# Patient Record
Sex: FEMALE | Race: WHITE | Employment: UNEMPLOYED | URBAN - METROPOLITAN AREA
[De-identification: names, ages, dates, MRNs, and addresses within clinical notes are randomized per-mention and may not be internally consistent; named-entity substitution may affect disease eponyms.]

---

## 2018-08-04 ENCOUNTER — APPOINTMENT (OUTPATIENT)
Dept: CT IMAGING | Age: 43
End: 2018-08-04
Payer: OTHER MISCELLANEOUS

## 2018-08-04 ENCOUNTER — HOSPITAL ENCOUNTER (EMERGENCY)
Age: 43
Discharge: HOME | End: 2018-08-04
Attending: EMERGENCY MEDICINE
Payer: OTHER MISCELLANEOUS

## 2018-08-04 VITALS
WEIGHT: 107 LBS | SYSTOLIC BLOOD PRESSURE: 134 MMHG | HEART RATE: 82 BPM | RESPIRATION RATE: 16 BRPM | BODY MASS INDEX: 22.46 KG/M2 | OXYGEN SATURATION: 98 % | TEMPERATURE: 98.1 F | HEIGHT: 58 IN | DIASTOLIC BLOOD PRESSURE: 84 MMHG

## 2018-08-04 DIAGNOSIS — M54.2 NECK PAIN: Primary | ICD-10-CM
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
## 2018-08-04 PROCEDURE — 96372 THER/PROPH/DIAG INJ SC/IM: CPT

## 2018-08-04 PROCEDURE — 70450 CT HEAD/BRAIN W/O DYE: CPT

## 2018-08-04 PROCEDURE — 72125 CT NECK SPINE W/O DYE: CPT

## 2018-08-04 PROCEDURE — 72128 CT CHEST SPINE W/O DYE: CPT

## 2018-08-04 PROCEDURE — 6360000002 HC RX W HCPCS: Performed by: EMERGENCY MEDICINE

## 2018-08-04 PROCEDURE — 99283 EMERGENCY DEPT VISIT LOW MDM: CPT

## 2018-08-04 RX ORDER — KETOROLAC TROMETHAMINE 30 MG/ML
15 INJECTION, SOLUTION INTRAMUSCULAR; INTRAVENOUS ONCE
Status: COMPLETED | OUTPATIENT
Start: 2018-08-04 | End: 2018-08-04

## 2018-08-04 RX ORDER — IBUPROFEN 800 MG/1
800 TABLET ORAL EVERY 8 HOURS PRN
Qty: 21 TABLET | Refills: 0 | Status: SHIPPED | OUTPATIENT
Start: 2018-08-04 | End: 2019-04-06 | Stop reason: ALTCHOICE

## 2018-08-04 RX ORDER — CYCLOBENZAPRINE HCL 5 MG
5 TABLET ORAL 2 TIMES DAILY PRN
Qty: 10 TABLET | Refills: 0 | Status: SHIPPED | OUTPATIENT
Start: 2018-08-04 | End: 2019-04-06 | Stop reason: ALTCHOICE

## 2018-08-04 RX ORDER — ORPHENADRINE CITRATE 30 MG/ML
60 INJECTION INTRAMUSCULAR; INTRAVENOUS ONCE
Status: COMPLETED | OUTPATIENT
Start: 2018-08-04 | End: 2018-08-04

## 2018-08-04 RX ADMIN — ORPHENADRINE CITRATE 60 MG: 30 INJECTION INTRAMUSCULAR; INTRAVENOUS at 04:34

## 2018-08-04 RX ADMIN — KETOROLAC TROMETHAMINE 15 MG: 30 INJECTION, SOLUTION INTRAMUSCULAR at 04:34

## 2018-08-04 ASSESSMENT — ENCOUNTER SYMPTOMS
ABDOMINAL PAIN: 0
SINUS PAIN: 0
BOWEL INCONTINENCE: 0
VISUAL CHANGE: 0
SHORTNESS OF BREATH: 0
VOMITING: 0
DIARRHEA: 0
CHEST TIGHTNESS: 0
SORE THROAT: 0
COUGH: 0

## 2018-08-04 ASSESSMENT — PAIN SCALES - GENERAL
PAINLEVEL_OUTOF10: 6
PAINLEVEL_OUTOF10: 5
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
## 2019-04-06 ENCOUNTER — HOSPITAL ENCOUNTER (EMERGENCY)
Age: 44
Discharge: HOME | End: 2019-04-06

## 2019-04-06 ENCOUNTER — APPOINTMENT (OUTPATIENT)
Dept: CT IMAGING | Age: 44
End: 2019-04-06

## 2019-04-06 VITALS
OXYGEN SATURATION: 100 % | DIASTOLIC BLOOD PRESSURE: 79 MMHG | TEMPERATURE: 98.8 F | HEIGHT: 58 IN | RESPIRATION RATE: 16 BRPM | WEIGHT: 97 LBS | SYSTOLIC BLOOD PRESSURE: 135 MMHG | BODY MASS INDEX: 20.36 KG/M2 | HEART RATE: 95 BPM

## 2019-04-06 DIAGNOSIS — S01.81XA FACIAL LACERATION, INITIAL ENCOUNTER: Primary | ICD-10-CM

## 2019-04-06 PROCEDURE — 90471 IMMUNIZATION ADMIN: CPT | Performed by: PHYSICIAN ASSISTANT

## 2019-04-06 PROCEDURE — 90715 TDAP VACCINE 7 YRS/> IM: CPT | Performed by: PHYSICIAN ASSISTANT

## 2019-04-06 PROCEDURE — 6370000000 HC RX 637 (ALT 250 FOR IP): Performed by: PHYSICIAN ASSISTANT

## 2019-04-06 PROCEDURE — 99284 EMERGENCY DEPT VISIT MOD MDM: CPT

## 2019-04-06 PROCEDURE — 6360000002 HC RX W HCPCS: Performed by: PHYSICIAN ASSISTANT

## 2019-04-06 PROCEDURE — 70450 CT HEAD/BRAIN W/O DYE: CPT

## 2019-04-06 PROCEDURE — 12011 RPR F/E/E/N/L/M 2.5 CM/<: CPT

## 2019-04-06 RX ORDER — ACETAMINOPHEN 500 MG
1000 TABLET ORAL ONCE
Status: COMPLETED | OUTPATIENT
Start: 2019-04-06 | End: 2019-04-06

## 2019-04-06 RX ADMIN — ACETAMINOPHEN 1000 MG: 500 TABLET ORAL at 20:05

## 2019-04-06 RX ADMIN — TETANUS TOXOID, REDUCED DIPHTHERIA TOXOID AND ACELLULAR PERTUSSIS VACCINE, ADSORBED 0.5 ML: 5; 2.5; 8; 8; 2.5 SUSPENSION INTRAMUSCULAR at 20:05

## 2019-04-06 ASSESSMENT — PAIN SCALES - GENERAL
PAINLEVEL_OUTOF10: 7
PAINLEVEL_OUTOF10: 8

## 2019-04-06 ASSESSMENT — PAIN DESCRIPTION - DESCRIPTORS: DESCRIPTORS: THROBBING

## 2019-04-06 ASSESSMENT — PAIN DESCRIPTION - PAIN TYPE: TYPE: ACUTE PAIN

## 2019-04-06 ASSESSMENT — PAIN SCALES - WONG BAKER: WONGBAKER_NUMERICALRESPONSE: 2

## 2019-04-06 ASSESSMENT — PAIN DESCRIPTION - LOCATION: LOCATION: HEAD

## 2019-04-06 NOTE — ED PROVIDER NOTES
Revised: None. Flaps Aligned: yes. The wound was closed with Dermabond. Dressing:  No dressing was placed. There were no additional lacerations requiring repair. Medical Decision Making:      Presents with a minor head injury which fell in her driveway. Laceration irrigated and repaired in usual fashion with Dermabond. Patient's tetanus was updated. CAT scan unremarkable. Patient advised follow up with primary care as needed. Counseling: The emergency provider has spoken with the patient and discussed todays results, in addition to providing specific details for the plan of care and counseling regarding the diagnosis and prognosis. Questions are answered at this time and they are agreeable with the plan. Assessment      1. Facial laceration, initial encounter      Plan   Discharge to home  Patient condition is good    New Medications     There are no discharge medications for this patient. Electronically signed by Solomon Solo PA-C   DD: 4/6/19  **This report was transcribed using voice recognition software. Every effort was made to ensure accuracy; however, inadvertent computerized transcription errors may be present.   END OF ED PROVIDER NOTE       Solomon Solo PA-C  04/07/19 0001       Solomon Solo PA-C  04/07/19 0002

## 2019-04-06 NOTE — ED NOTES
Radiology Procedure Waiver   Name: Vera Gitelman  : 1975  MRN: [id number]    Date:  19    Time: 7:28 PM    Benefits of immediately proceeding with Radiology exam(s) without pre-testing outweigh the risks or are not indicated as specified below and therefore the following is/are being waived:    [x] Pregnancy test   [] Patients LMP on-time and regular. [x] Patient had Tubal Ligation or has other Contraception Device. [] Patient  is Menopausal or Premenarcheal.    [] Patient had Full or Partial Hysterectomy. [] Protocol for Iodine allergy    [] MRI Questionnaire     [] BUN/Creatinine   [] Patient age w/no hx of renal dysfunction. [] Patient on Dialysis. [] Recent Normal Labs.   Electronically signed by Joe Samayoa PA-C on 19 at 7:28 PM               Joe Samayoa PA-C  19 192

## 2019-08-01 ENCOUNTER — HOSPITAL ENCOUNTER (EMERGENCY)
Dept: HOSPITAL 83 - ED | Age: 44
Discharge: HOME | End: 2019-08-01
Payer: SELF-PAY

## 2019-08-01 VITALS — HEIGHT: 57.99 IN | BODY MASS INDEX: 22.25 KG/M2 | WEIGHT: 106 LBS

## 2019-08-01 DIAGNOSIS — F17.200: ICD-10-CM

## 2019-08-01 DIAGNOSIS — Z88.6: ICD-10-CM

## 2019-08-01 DIAGNOSIS — R63.0: ICD-10-CM

## 2019-08-01 DIAGNOSIS — R51: Primary | ICD-10-CM

## 2019-08-01 DIAGNOSIS — R11.2: ICD-10-CM

## 2019-08-01 LAB
ALBUMIN SERPL-MCNC: 3.5 GM/DL (ref 3.1–4.5)
ALP SERPL-CCNC: 69 U/L (ref 45–117)
ALT SERPL W P-5'-P-CCNC: 15 U/L (ref 12–78)
AST SERPL-CCNC: 16 IU/L (ref 3–35)
BASOPHILS # BLD AUTO: 0.1 10*3/UL (ref 0–0.1)
BASOPHILS NFR BLD AUTO: 0.8 % (ref 0–1)
BUN SERPL-MCNC: 16 MG/DL (ref 7–24)
CHLORIDE SERPL-SCNC: 111 MMOL/L (ref 98–107)
CREAT SERPL-MCNC: 0.6 MG/DL (ref 0.55–1.02)
EOSINOPHIL # BLD AUTO: 0.2 10*3/UL (ref 0–0.4)
EOSINOPHIL # BLD AUTO: 3.1 % (ref 1–4)
ERYTHROCYTE [DISTWIDTH] IN BLOOD BY AUTOMATED COUNT: 17.6 % (ref 0–14.5)
HCT VFR BLD AUTO: 27.4 % (ref 37–47)
HGB BLD-MCNC: 7.8 G/DL (ref 12–16)
LYMPHOCYTES # BLD AUTO: 2.2 10*3/UL (ref 1.3–4.4)
LYMPHOCYTES NFR BLD AUTO: 30.5 % (ref 27–41)
MCH RBC QN AUTO: 20.7 PG (ref 27–31)
MCHC RBC AUTO-ENTMCNC: 28.5 G/DL (ref 33–37)
MCV RBC AUTO: 72.7 FL (ref 81–99)
MONOCYTES # BLD AUTO: 0.8 10*3/UL (ref 0.1–1)
MONOCYTES NFR BLD MANUAL: 10.7 % (ref 3–9)
NEUT #: 3.9 10*3/UL (ref 2.3–7.9)
NEUT %: 54.6 % (ref 47–73)
NRBC BLD QL AUTO: 0 10*3/UL (ref 0–0)
PLATELET # BLD AUTO: 325 10*3/UL (ref 130–400)
PMV BLD AUTO: 10.2 FL (ref 9.6–12.3)
POTASSIUM SERPL-SCNC: 3.1 MMOL/L (ref 3.5–5.1)
PROT SERPL-MCNC: 6.9 GM/DL (ref 6.4–8.2)
RBC # BLD AUTO: 3.77 10*6/UL (ref 4.1–5.1)
SODIUM SERPL-SCNC: 142 MMOL/L (ref 136–145)
WBC NRBC COR # BLD AUTO: 7.2 10*3/UL (ref 4.8–10.8)

## 2019-08-05 ENCOUNTER — HOSPITAL ENCOUNTER (INPATIENT)
Dept: HOSPITAL 83 - ED | Age: 44
LOS: 2 days | Discharge: HOME | DRG: 384 | End: 2019-08-07
Attending: INTERNAL MEDICINE | Admitting: INTERNAL MEDICINE
Payer: SELF-PAY

## 2019-08-05 VITALS — HEIGHT: 58 IN | WEIGHT: 108.19 LBS | BODY MASS INDEX: 22.71 KG/M2

## 2019-08-05 VITALS — DIASTOLIC BLOOD PRESSURE: 66 MMHG

## 2019-08-05 VITALS — SYSTOLIC BLOOD PRESSURE: 110 MMHG | DIASTOLIC BLOOD PRESSURE: 78 MMHG

## 2019-08-05 VITALS — DIASTOLIC BLOOD PRESSURE: 74 MMHG

## 2019-08-05 VITALS — DIASTOLIC BLOOD PRESSURE: 72 MMHG

## 2019-08-05 VITALS — DIASTOLIC BLOOD PRESSURE: 61 MMHG | SYSTOLIC BLOOD PRESSURE: 115 MMHG

## 2019-08-05 DIAGNOSIS — K25.9: Primary | ICD-10-CM

## 2019-08-05 DIAGNOSIS — K29.70: ICD-10-CM

## 2019-08-05 DIAGNOSIS — Z90.49: ICD-10-CM

## 2019-08-05 DIAGNOSIS — Z98.2: ICD-10-CM

## 2019-08-05 DIAGNOSIS — K20.9: ICD-10-CM

## 2019-08-05 DIAGNOSIS — E87.8: ICD-10-CM

## 2019-08-05 DIAGNOSIS — Z98.891: ICD-10-CM

## 2019-08-05 DIAGNOSIS — Z88.8: ICD-10-CM

## 2019-08-05 DIAGNOSIS — Z98.51: ICD-10-CM

## 2019-08-05 DIAGNOSIS — D25.9: ICD-10-CM

## 2019-08-05 DIAGNOSIS — G89.29: ICD-10-CM

## 2019-08-05 DIAGNOSIS — Z79.899: ICD-10-CM

## 2019-08-05 DIAGNOSIS — E44.0: ICD-10-CM

## 2019-08-05 DIAGNOSIS — E87.6: ICD-10-CM

## 2019-08-05 DIAGNOSIS — Z83.3: ICD-10-CM

## 2019-08-05 LAB
ALBUMIN SERPL-MCNC: 3.4 GM/DL (ref 3.1–4.5)
ALP SERPL-CCNC: 72 U/L (ref 45–117)
ALT SERPL W P-5'-P-CCNC: 15 U/L (ref 12–78)
APPEARANCE UR: (no result)
AST SERPL-CCNC: 15 IU/L (ref 3–35)
B-HCG SERPL-ACNC: < 1 MIU/ML (ref 1–3)
BACTERIA #/AREA URNS HPF: (no result) /[HPF]
BASOPHILS # BLD AUTO: 0.1 10*3/UL (ref 0–0.1)
BASOPHILS NFR BLD AUTO: 0.7 % (ref 0–1)
BILIRUB UR QL STRIP: NEGATIVE
BUN SERPL-MCNC: 11 MG/DL (ref 7–24)
BUN SERPL-MCNC: 12 MG/DL (ref 7–24)
CHLORIDE SERPL-SCNC: 111 MMOL/L (ref 98–107)
CHLORIDE SERPL-SCNC: 111 MMOL/L (ref 98–107)
COLOR UR: YELLOW
CREAT SERPL-MCNC: 0.46 MG/DL (ref 0.55–1.02)
CREAT SERPL-MCNC: 0.49 MG/DL (ref 0.55–1.02)
EOSINOPHIL # BLD AUTO: 0.4 10*3/UL (ref 0–0.4)
EOSINOPHIL # BLD AUTO: 3.5 % (ref 1–4)
EPI CELLS #/AREA URNS HPF: (no result) /[HPF]
ERYTHROCYTE [DISTWIDTH] IN BLOOD BY AUTOMATED COUNT: 18.1 % (ref 0–14.5)
GLUCOSE UR QL: NEGATIVE
HCT VFR BLD AUTO: 29.3 % (ref 37–47)
HGB BLD-MCNC: 8.1 G/DL (ref 12–16)
HGB UR QL STRIP: (no result)
KETONES UR QL STRIP: (no result)
LEUKOCYTE ESTERASE UR QL STRIP: (no result)
LIPASE SERPL-CCNC: 126 U/L (ref 73–393)
LYMPHOCYTES # BLD AUTO: 2.7 10*3/UL (ref 1.3–4.4)
LYMPHOCYTES NFR BLD AUTO: 27 % (ref 27–41)
MCH RBC QN AUTO: 20.6 PG (ref 27–31)
MCHC RBC AUTO-ENTMCNC: 27.6 G/DL (ref 33–37)
MCV RBC AUTO: 74.4 FL (ref 81–99)
MONOCYTES # BLD AUTO: 0.8 10*3/UL (ref 0.1–1)
MONOCYTES NFR BLD MANUAL: 7.7 % (ref 3–9)
NEUT #: 6.1 10*3/UL (ref 2.3–7.9)
NEUT %: 60.8 % (ref 47–73)
NITRITE UR QL STRIP: POSITIVE
NRBC BLD QL AUTO: 0 % (ref 0–0)
PH UR STRIP: 5.5 [PH] (ref 5–9)
PLATELET # BLD AUTO: 341 10*3/UL (ref 130–400)
PMV BLD AUTO: 10.2 FL (ref 9.6–12.3)
POTASSIUM SERPL-SCNC: 2.9 MMOL/L (ref 3.5–5.1)
POTASSIUM SERPL-SCNC: 3.3 MMOL/L (ref 3.5–5.1)
PROT SERPL-MCNC: 6.8 GM/DL (ref 6.4–8.2)
RBC # BLD AUTO: 3.94 10*6/UL (ref 4.1–5.1)
RBC #/AREA URNS HPF: (no result) RBC/HPF (ref 0–2)
SODIUM SERPL-SCNC: 140 MMOL/L (ref 136–145)
SODIUM SERPL-SCNC: 140 MMOL/L (ref 136–145)
SP GR UR: >= 1.03 (ref 1–1.03)
UROBILINOGEN UR STRIP-MCNC: 0.2 E.U./DL (ref 0.2–1)
WBC #/AREA URNS HPF: (no result) WBC/HPF (ref 0–5)
WBC NRBC COR # BLD AUTO: 10.1 10*3/UL (ref 4.8–10.8)

## 2019-08-05 NOTE — NUR
ABD PAIN RATED 7/10. ASKED IF SHE WOULD LIKE TYELNOL AS ORDERD. PT DECLINED.
CALL LIGHT IN REACH.  AT BEDSIDE. HR 60s PER CM.

## 2019-08-05 NOTE — EKG
[place], [place]
 
                               ELECTROCARDIOGRAM REPORT
 
        NAME: RYAA PHILLIPS                     ACCT #: [id number]  
        UNIT #: [id number]                        ROOM: 518       
        DOCTOR: ÁNGELA DRAFT REPORT          BIRTHDATE: [birth year]
 
 
 

 
 
                           [facility]
                                       
Test Date:    2019               Test Time:    06:33:44
Pat Name:     RAYA PHILLIPS             Department:   
Patient ID:   [id number]             Room:         518 1
Gender:       F                        Technician:   TL
:          1975               Requested By: STEFAN HILL
Order Number: [id number]      Reading MD:   Sandra Clements MD
                                 Measurements
Intervals                              Axis          
Rate:         60                       P:            38
MS:           141                      QRS:          -12
QRSD:         82                       T:            39
QT:           445                                    
QTc:          445                                    
                           Interpretive Statements
Sinus rhythm
Normal ECg
Electronically Signed On 2019 14:29:58 PDT by Sandra Clements MD
 
CM:EKGRPT:ELECTROCARDIOGRAM REPORT
 
D: 19 0633
T: 19 1429
    
STEFAN MOTTA DRAFT REPORT         
STEFAN HILL

## 2019-08-05 NOTE — NUR
PROMPTED PATIENT FOR URINE AT THIS TIME. PER PATIENT SHE WILL TRY TO PROVIDE
URINE SAMPLE. RESPIRATIONS EASY, NON-LABORED ON ROOM AIR. PATIENT RESTING
QUIETLY IN BED AT THIS TIME WITH EYES CLOSED. PER PATIENT PAIN IS STILL THE
SAME.

## 2019-08-05 NOTE — EKG
[place], [place]
 
                               ELECTROCARDIOGRAM REPORT
 
        NAME: RAYA PHILLIPS                     ACCT #: [id number]  
        UNIT #: [id number]                        ROOM: 518       
        DOCTOR: ÁNGELA DRAFT REPORT          BIRTHDATE: [birth year]
 
 
 

 
 
                           [facility]
                                       
Test Date:    2019               Test Time:    04:01:57
Pat Name:     RAYA PHILLIPS             Department:   
Patient ID:   [id number]             Room:         518
Gender:       F                        Technician:   Leon Sarkar
:          1975               Requested By: GRAY HERCULES
Order Number: [id number]      Reading MD:   Berny Isaacs MD
                                 Measurements
Intervals                              Axis          
Rate:         57                       P:            16
FL:           106                      QRS:          -18
QRSD:         92                       T:            41
QT:           447                                    
QTc:          436                                    
                           Interpretive Statements
Sinus rhythm
Short FL interval
Borderline left axis deviation
RSR' in V1 or V2, probably normal variant
Nonspecific T abnormalities, anterior leads
 
 
Electronically Signed On 2019 12:01:26 PDT by Berny Isaacs MD
 
CM:EKGRPT:ELECTROCARDIOGRAM REPORT
 
D: 19 0401
T: 19 1201
    
GRAY MULLEN DRAFT REPORT         
GRAY HERCULES DO

## 2019-08-06 VITALS — DIASTOLIC BLOOD PRESSURE: 54 MMHG | SYSTOLIC BLOOD PRESSURE: 104 MMHG

## 2019-08-06 VITALS — SYSTOLIC BLOOD PRESSURE: 124 MMHG | DIASTOLIC BLOOD PRESSURE: 66 MMHG

## 2019-08-06 VITALS — DIASTOLIC BLOOD PRESSURE: 58 MMHG

## 2019-08-06 VITALS — DIASTOLIC BLOOD PRESSURE: 55 MMHG

## 2019-08-06 VITALS — DIASTOLIC BLOOD PRESSURE: 60 MMHG

## 2019-08-06 VITALS — DIASTOLIC BLOOD PRESSURE: 61 MMHG | SYSTOLIC BLOOD PRESSURE: 107 MMHG

## 2019-08-06 VITALS — DIASTOLIC BLOOD PRESSURE: 51 MMHG

## 2019-08-06 VITALS — SYSTOLIC BLOOD PRESSURE: 75 MMHG | DIASTOLIC BLOOD PRESSURE: 33 MMHG

## 2019-08-06 VITALS — DIASTOLIC BLOOD PRESSURE: 54 MMHG

## 2019-08-06 PROCEDURE — 0DB68ZX EXCISION OF STOMACH, VIA NATURAL OR ARTIFICIAL OPENING ENDOSCOPIC, DIAGNOSTIC: ICD-10-PCS

## 2019-08-06 NOTE — NUR
in to talk to patient.
Patient states lives at HOME with  AND KIDS.
There are NO steps in the home.
Physician: NONE AT THIS TIME
Pharmacy: [facility] [address] [address]
Home health services: NONE
Patient's level of ADLs: INDEPENDENT
Patient has working utilities: YES
DME: NO
Follow-up physician's appointment after d/c: DOES NOT HAVE PCP AT THIS TIME.
Does patient want to access PORTAL?: NO
Discharge plan PT LIVES AT HOME WITH  AND KIDS. STATES SHE WILL RETURN
HOME ON DISCHARGE AND WILL HAVE NO NEEDS.  WILL CONTINUE TO FOLLOW.  PT STATES
SHE WILL HAVE A RIDE HOME ON DISCHARGE.
 
VALENCIA DAWSON

## 2019-08-07 VITALS — DIASTOLIC BLOOD PRESSURE: 58 MMHG | SYSTOLIC BLOOD PRESSURE: 109 MMHG

## 2019-08-07 VITALS — SYSTOLIC BLOOD PRESSURE: 109 MMHG | DIASTOLIC BLOOD PRESSURE: 65 MMHG

## 2019-08-07 NOTE — NUR
Discharge instructions reviewed with patient/family. Patient receptive and
verbalizes understanding. Follow-up care arranged. Written instructions given
to patient/family.
FRANCINE WELSH

## 2019-12-27 ENCOUNTER — HOSPITAL ENCOUNTER (EMERGENCY)
Dept: HOSPITAL 83 - ED | Age: 44
Discharge: HOME | End: 2019-12-27
Payer: SELF-PAY

## 2019-12-27 VITALS — BODY MASS INDEX: 24.14 KG/M2 | HEIGHT: 57.99 IN | WEIGHT: 115 LBS

## 2019-12-27 DIAGNOSIS — D25.9: ICD-10-CM

## 2019-12-27 DIAGNOSIS — R10.2: ICD-10-CM

## 2019-12-27 DIAGNOSIS — Z88.6: ICD-10-CM

## 2019-12-27 DIAGNOSIS — D64.9: ICD-10-CM

## 2019-12-27 DIAGNOSIS — Z79.2: ICD-10-CM

## 2019-12-27 DIAGNOSIS — Z90.49: ICD-10-CM

## 2019-12-27 DIAGNOSIS — Z98.2: ICD-10-CM

## 2019-12-27 DIAGNOSIS — G89.29: Primary | ICD-10-CM

## 2019-12-27 DIAGNOSIS — Z98.51: ICD-10-CM

## 2019-12-27 DIAGNOSIS — Z79.899: ICD-10-CM

## 2019-12-27 LAB
ALBUMIN SERPL-MCNC: 3.7 GM/DL (ref 3.1–4.5)
ALP SERPL-CCNC: 77 U/L (ref 45–117)
ALT SERPL W P-5'-P-CCNC: 18 U/L (ref 12–78)
APPEARANCE UR: (no result)
APTT PPP: 24.2 SECONDS (ref 20–32.1)
AST SERPL-CCNC: 20 IU/L (ref 3–35)
BACTERIA #/AREA URNS HPF: (no result) /[HPF]
BASOPHILS # BLD AUTO: 0.1 10*3/UL (ref 0–0.1)
BASOPHILS NFR BLD AUTO: 0.9 % (ref 0–1)
BILIRUB UR QL STRIP: NEGATIVE
BUN SERPL-MCNC: 13 MG/DL (ref 7–24)
CHLORIDE SERPL-SCNC: 112 MMOL/L (ref 98–107)
COLOR UR: YELLOW
CREAT SERPL-MCNC: 0.57 MG/DL (ref 0.55–1.02)
EOSINOPHIL # BLD AUTO: 0.2 10*3/UL (ref 0–0.4)
EOSINOPHIL # BLD AUTO: 1.9 % (ref 1–4)
EPI CELLS #/AREA URNS HPF: (no result) /[HPF]
ERYTHROCYTE [DISTWIDTH] IN BLOOD BY AUTOMATED COUNT: 17.4 % (ref 0–14.5)
GLUCOSE UR QL: NEGATIVE
HCT VFR BLD AUTO: 29.6 % (ref 37–47)
HGB BLD-MCNC: 8.1 G/DL (ref 12–16)
HGB UR QL STRIP: (no result)
INR BLD: 0.9 (ref 2–3.5)
KETONES UR QL STRIP: (no result)
LEUKOCYTE ESTERASE UR QL STRIP: (no result)
LYMPHOCYTES # BLD AUTO: 1.5 10*3/UL (ref 1.3–4.4)
LYMPHOCYTES NFR BLD AUTO: 19.5 % (ref 27–41)
MCH RBC QN AUTO: 20 PG (ref 27–31)
MCHC RBC AUTO-ENTMCNC: 27.4 G/DL (ref 33–37)
MCV RBC AUTO: 72.9 FL (ref 81–99)
MONOCYTES # BLD AUTO: 0.5 10*3/UL (ref 0.1–1)
MONOCYTES NFR BLD MANUAL: 6.5 % (ref 3–9)
MUCOUS THREADS URNS QL MICRO: (no result)
NEUT #: 5.6 10*3/UL (ref 2.3–7.9)
NEUT %: 70.9 % (ref 47–73)
NITRITE UR QL STRIP: POSITIVE
NRBC BLD QL AUTO: 0 % (ref 0–0)
PH UR STRIP: 7 [PH] (ref 5–9)
PLATELET # BLD AUTO: 347 10*3/UL (ref 130–400)
PMV BLD AUTO: 9.9 FL (ref 9.6–12.3)
POTASSIUM SERPL-SCNC: 4.1 MMOL/L (ref 3.5–5.1)
PROT SERPL-MCNC: 7.3 GM/DL (ref 6.4–8.2)
RBC # BLD AUTO: 4.06 10*6/UL (ref 4.1–5.1)
RBC #/AREA URNS HPF: (no result) RBC/HPF (ref 0–2)
SODIUM SERPL-SCNC: 141 MMOL/L (ref 136–145)
SP GR UR: 1.02 (ref 1–1.03)
UROBILINOGEN UR STRIP-MCNC: 0.2 E.U./DL (ref 0.2–1)
WBC #/AREA URNS HPF: (no result) WBC/HPF (ref 0–5)
WBC NRBC COR # BLD AUTO: 7.8 10*3/UL (ref 4.8–10.8)